# Patient Record
Sex: MALE | Race: WHITE | NOT HISPANIC OR LATINO | Employment: OTHER | URBAN - METROPOLITAN AREA
[De-identification: names, ages, dates, MRNs, and addresses within clinical notes are randomized per-mention and may not be internally consistent; named-entity substitution may affect disease eponyms.]

---

## 2020-09-14 ENCOUNTER — TRANSFERRED RECORDS (OUTPATIENT)
Dept: HEALTH INFORMATION MANAGEMENT | Facility: CLINIC | Age: 25
End: 2020-09-14

## 2020-12-30 ENCOUNTER — TRANSFERRED RECORDS (OUTPATIENT)
Dept: HEALTH INFORMATION MANAGEMENT | Facility: CLINIC | Age: 25
End: 2020-12-30

## 2023-07-18 ENCOUNTER — TRANSFERRED RECORDS (OUTPATIENT)
Dept: HEALTH INFORMATION MANAGEMENT | Facility: CLINIC | Age: 28
End: 2023-07-18

## 2023-10-02 ENCOUNTER — TRANSCRIBE ORDERS (OUTPATIENT)
Dept: OTHER | Age: 28
End: 2023-10-02

## 2023-10-02 DIAGNOSIS — Q87.81 ALPORTS SYNDROME: Primary | ICD-10-CM

## 2023-10-09 ENCOUNTER — OFFICE VISIT (OUTPATIENT)
Dept: CONSULT | Facility: CLINIC | Age: 28
End: 2023-10-09
Attending: STUDENT IN AN ORGANIZED HEALTH CARE EDUCATION/TRAINING PROGRAM
Payer: COMMERCIAL

## 2023-10-09 VITALS
HEIGHT: 70 IN | WEIGHT: 135.58 LBS | DIASTOLIC BLOOD PRESSURE: 71 MMHG | BODY MASS INDEX: 19.41 KG/M2 | HEART RATE: 102 BPM | RESPIRATION RATE: 18 BRPM | SYSTOLIC BLOOD PRESSURE: 108 MMHG | OXYGEN SATURATION: 97 %

## 2023-10-09 DIAGNOSIS — Z71.83 ENCOUNTER FOR NONPROCREATIVE GENETIC COUNSELING AND TESTING: Primary | ICD-10-CM

## 2023-10-09 DIAGNOSIS — Z71.3: ICD-10-CM

## 2023-10-09 DIAGNOSIS — K63.9 ENTEROPATHY: ICD-10-CM

## 2023-10-09 DIAGNOSIS — Z71.3 RESTRICTED DIET: Primary | ICD-10-CM

## 2023-10-09 DIAGNOSIS — Z13.71 ENCOUNTER FOR NONPROCREATIVE GENETIC COUNSELING AND TESTING: Primary | ICD-10-CM

## 2023-10-09 DIAGNOSIS — Z71.3 RESTRICTED DIET: ICD-10-CM

## 2023-10-09 DIAGNOSIS — Z84.1 MATERNAL FAMILY HISTORY OF RENAL DISEASE: ICD-10-CM

## 2023-10-09 DIAGNOSIS — Z84.1: ICD-10-CM

## 2023-10-09 LAB
CALCIUM SERPL-MCNC: 9.5 MG/DL (ref 8.6–10)
FERRITIN SERPL-MCNC: 67 NG/ML (ref 31–409)
IRON SERPL-MCNC: 168 UG/DL (ref 61–157)
PHOSPHATE SERPL-MCNC: 3.4 MG/DL (ref 2.5–4.5)
VIT B12 SERPL-MCNC: 1038 PG/ML (ref 232–1245)
VIT D+METAB SERPL-MCNC: 33 NG/ML (ref 20–50)

## 2023-10-09 PROCEDURE — 82310 ASSAY OF CALCIUM: CPT

## 2023-10-09 PROCEDURE — 84100 ASSAY OF PHOSPHORUS: CPT

## 2023-10-09 PROCEDURE — 84134 ASSAY OF PREALBUMIN: CPT

## 2023-10-09 PROCEDURE — 36415 COLL VENOUS BLD VENIPUNCTURE: CPT

## 2023-10-09 PROCEDURE — 82525 ASSAY OF COPPER: CPT

## 2023-10-09 PROCEDURE — 99204 OFFICE O/P NEW MOD 45 MIN: CPT | Performed by: STUDENT IN AN ORGANIZED HEALTH CARE EDUCATION/TRAINING PROGRAM

## 2023-10-09 PROCEDURE — 84255 ASSAY OF SELENIUM: CPT

## 2023-10-09 PROCEDURE — 82607 VITAMIN B-12: CPT

## 2023-10-09 PROCEDURE — 96040 HC GENETIC COUNSELING, EACH 30 MINUTES: CPT

## 2023-10-09 PROCEDURE — 999N000069 HC STATISTIC GENETIC COUNSELING, < 16 MIN

## 2023-10-09 PROCEDURE — 82306 VITAMIN D 25 HYDROXY: CPT

## 2023-10-09 PROCEDURE — 82728 ASSAY OF FERRITIN: CPT

## 2023-10-09 PROCEDURE — 84630 ASSAY OF ZINC: CPT

## 2023-10-09 PROCEDURE — 83540 ASSAY OF IRON: CPT

## 2023-10-09 PROCEDURE — 82725 ASSAY OF BLOOD FATTY ACIDS: CPT

## 2023-10-09 PROCEDURE — 82139 AMINO ACIDS QUAN 6 OR MORE: CPT

## 2023-10-09 NOTE — LETTER
"10/9/2023      RE: Patrick Elizabeth  [address]  [facility] [address]     Dear Colleague,    Thank you for the opportunity to participate in the care of your patient, Patrick Elizabeth, at the [facility] at [facility]. Please see a copy of my visit note below.    GENETICS CLINIC CONSULTATION     Name:  Patrick Elizabeth \"Patrick\"  :   1995  MRN:   [id number]  Date of service: Oct 9, 2023  Primary Provider: System, Provider Not In  Referring Provider: Provider Not In System    Presenting Information:  Patrick Elizabeth is a 28 year old male presenting to general genetics clinic due to a history of severe and extensive allergies, immunodeficiencies, and non diagnostic genetic testing. He was here today with his mother and father. I met with the family at the request of Dr. Mack to obtain a 3 generation family history, discuss genetic testing options and obtain informed consent.     HPI:  Patrick has a life long history of non-anaphylactic allergies to both foods and environmental allergens. He has only consumed Neocate infant elemental formula since infancy. His primary symptoms after exposures are gastrointestinal distress with diarrhea. He also notes that cutaneous exposures will prompt rashes and inhaled exposures will aggravate his sinuses.    Aside from copious interruptions to his schooling and typical childhood experiences due to severe medical needs, Patrick is reasonably neurotypical. He has had neuropsychological testing, which is included in his referral notes. Patrick shares that he has depression managed with neuro biofeedback therapy which is working well.     Patrick was hospitalized frequently as a child. His mother notes that when he was ill his body temperature would drop (sometimes to 94-95 F) and she would have to bundle him and use heating pads to keep him warm. His family endorses an almost " cyclical nature to his upper respiratory symptoms, despite Patrick rarely leaving the home and parents both masking 100% of the time outside the home.    Patrick describes his illness as having flare-ups which then return to his baseline. Sometimes it will take many months to return to baseline after a flare up. Patrick shared that, over time, his baseline level of health distress is worsening.    Upon recently requesting Patrick's pediatric records, his mother notes that he was found to have blood and protein in his urine as a child.     Please see Dr. Mack's note for details.    Social History  Patrick lives at home with his parents. He primarily does not leave the home.  Father available for testing: Yes  Mother available for testing: Yes  Full sibling available for testing: Yes (not in [place])  Half sibling available for testing: NA    Pregnancy/ History  Mother's age: 33 years  Father's age: 36 years  Patrick was born at 39 weeks gestation via vaginal delivery   Prenatal care was received.  Pregnancy complications included history of  labor, placental separation, and pre-eclampsia  Prenatal testing included ultrasounds  Prenatal exposure and acute maternal illness during pregnancy: none reported  The APGAR scores were unavailable  Birth weight: 7 lb 12 oz  Birth length: Data Unavailable  Birth head circumference: Data unavailable  Complications in the  period included: Jaundice. Mother reports jaundice resolving once they started Patrick on Neocate elemental formula.    Developmental History  Reasonably typical aside from impacts of chronic illness on development. Patrick's parents pursued many therapies to assist him in development despite these obstacles.    Previous Genetic Testing  Whole Exome Sequencing with Mitochondrial Analysis through [facility] in ; ordered by Dr. Pawel Barragan. Non-diagnostic. 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 TLR3 has no currently described disease association. [facility] Secondary Findings were ACCEPTED and NEGATIVE. Mitochondrial analysis was NEGATIVE.    Patrick is also involved in the research program [employer] Kids ([email or web address]-[email or web address]-answers-[email or web address]).    Patrick has also previously been seen by the [facility]osed Disease [facility] (UDN).    Family History:   A three generation pedigree was obtained today by patient report and scanned into the EMR. The following information is significant:    Children:  Patrick has no biological children by choice.  Siblings:  Patrick is the third child born to his parents together.   His eldest sister, Marni, is 34 years old.There is suspicion that she has fibromyalgia. She does not have children.  His other sister, Sophia, is 32 years old. She has Bechets and was recently found to have Alport Syndrome. She has a 7 month son.  Maternal Relatives:  Mother is Marleny. She is 59 years old. She has a history of food allergies with onset in adulthood requiring her to eat a very limited diet. She has fibromyalgia. She was found to have a pathogenic COL4A5 mutation (c.1871G>A; p.[id number]) which is consistent with X-linked Alport Syndrome. She noted that she does have hematuria.   Marleny has a 55 year old sister who has brain cancer and fibromyalgia. She has a daughter with a history of a legume allergy she outgrew and a healthy son.  Grandmother passed away at 86 years old from dementia. She had onset of food allergies later in life. Her father (Marleny's maternal grandfather) was born with \"deformed\" fingers and toes. He was otherwise in typical health and lived into old age.  Grandfather passed away in his late 80s from prostate cancer.  Paternal Relatives:  Father is Tobias. He is 62 years old. He has a history of thin basement membrane nephropathy (TBMN) and was found to have an uncertain variant in COL4A4, " "which is associated with both dominant and recessive Alport Syndrome. Per the genetic testing laboratory that completed his testing, there is strong suspicion that this uncertain variant is harmful and is likely the explanation for his TBMN.  Tobias has a sister with kidney stones and heart ablation.  Tobias has a brother with kidney stones.  Tobias has a niece with arthritis and other nieces/nephews who are well.  Grandmother has arthritis and autoimmune disease. Her sister's daughter (Saba) has high myopia.  Grandfather has \"heart problems\".      Ancestry deferred. Consanguinity denied.     The remainder of the family history was negative for birth defects, learning difficulties, intellectual disability, vision/hearing loss, seizures, muscle weakness, recurrent miscarriage, sudden death, infant/ death and known genetic conditions.     Please see scanned in pedigree under the media tab.     Discussion:    We discussed the option of Whole Exome Sequencing re-analysis given updates in Patrick's personal and family medical history since his original testing.    We reviewed that this would not require an additional sample and would be no charge. Basically, the lab runs the existing data through their interpretation software again to see if there are any genetic differences that they did not think were significant when the testing was originally done that they think are significant now. This process typically takes a couple of months.    We discussed how providing updated history for Patrick as well as the new family history of Alport Syndrome is very important because the lab's interpretation of KATYA data is dependent on the personal and family medical history that we provide to the laboratory.    Patrick elected to proceed with re-analysis at this time. He and his parents completed informed consent paperwork again since the original testing was through a different healthcare system.    We reviewed ACMG " Secondary Findings and discussed that the list of included genes as been updated since Patrick's original testing. Previously, the family opted IN for Secondary findings for the proband and relatives. The family reaffirmed their desire to OPT IN for secondary findings for Patrick and relatives for the reanalysis.    Additional questions denied at this time. The family is encouraged to reach out if there are questions in the interim.     Plan  1. No charge exome re-analysis through [facility] with updated personal and family medical information  2. Results anticipated a couple of months after testing begins. We will call when they are available.  3. Additional recommendations per Dr. Mack     Please do not hesitate to reach out with any questions or concerns. It was a pleasure to participate in Patrick's care today.       Eloisa Ag MS, [facility]  Genetic Counseling  [facility]  Pager: [phone]  Phone: [phone]  Fax: [phone]       Approximate Time Spent in Consultation: 40 min          Please do not hesitate to contact me if you have any questions/concerns.     Sincerely,       Eloisa Ag, GC

## 2023-10-09 NOTE — PATIENT INSTRUCTIONS
Plan  1. No charge exome re-analysis through [facility] with updated personal and family medical information  2. Results anticipated a couple of months after testing begins. We will call when they are available.  3. Additional recommendations per Dr. Mack     Please do not hesitate to reach out with any questions or concerns. It was a pleasure to participate in Patrick's care today.       Eloisa Ag MS, [facility]  Genetic Counseling  [facility]  Phone: [phone]  Fax: [phone]

## 2023-10-09 NOTE — NURSING NOTE
"Chief Complaint   Patient presents with    Consult     /71   Pulse 102   Resp 18   Ht 1.77 m (5' 9.69\")   Wt 61.5 kg (135 lb 9.3 oz)   SpO2 97%   BMI 19.63 kg/m    Cortes Samson  October 9, 2023    "

## 2023-10-09 NOTE — LETTER
10/9/2023      RE: Patrick Elizabeth  [address]  [facility] [address]     Dear Colleague,    Thank you for the opportunity to participate in the care of your patient, Patrick Elizabeth, at the [facility] at [facility]. Please see a copy of my visit note below.    Patient: Patrick Elizabeth  YOB: 1995  Medical Record: [id number]  Visit date: Oct 9, 2023    Dear colleague,     It was a great pleasure to see Patrick Elizabeth in genetics clinic.   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  Genetic workup so far has been nonrevealing.  We will reinterrogate existing testing as our next step    Please see additional details and more complete assessment and plan in the note that follows below.    Chief Complaint:   - Patrick Elizabeth is a 28 year old male presenting to general genetics clinic due to a history of severe and extensive allergies, immunodeficiencies, and non diagnostic genetic testing.     History of present illness:   - Patrick has a long and severe history of significant known anaphylactic allergies.  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  Particularly that this impacts his eating.  He has taken essentially only Neocate infant elemental formula his whole life.  If he has other foods this leads to gas parental show external distress and diarrhea.  Exposures to the skin will lead to rashes and inhaled exposures can lead to sinus symptoms.  He has significant eczema and acne.  He has chronic benign neutropenia and is on cholestyramine previous to that he had no solid stools for 7 years.  At times he has protein and blood in urine. He has lots of congestion.  On elbows and knees he has dry flaking skin.  When he gets sick his temperature actually goes down to 94 to 95  F.    Additionally during periods of  illness he has had significant and severe neurological reactions with depression, tiredness, mental fog, memory loss, and cognitive problems with skill regression.  At times he has had to relearn skills.  A flu case completely erased his memory for some basic math facts and activities of daily living.    Previous exome analysis at [facility] I was nonrevealing of a diagnosis for his condition.    Finally in addition: Alport syndrome was new recently noted in the family.  Father has a COL4A4 VUS and mother has a COL4A5 pathogenic variant.     from GC note today:   Patrick has a life long history of non-anaphylactic allergies to both foods and environmental allergens. He has only consumed Neocate infant elemental formula since infancy. His primary symptoms after exposures are gastrointestinal distress with diarrhea. He also notes that cutaneous exposures will prompt rashes and inhaled exposures will aggravate his sinuses.  Aside from copious interruptions to his schooling and typical childhood experiences due to severe medical needs, Patrick is reasonably neurotypical. He has had neuropsychological testing, which is included in his referral notes. Patrick shares that he has depression managed with neuro biofeedback therapy which is working well.     Patrick was hospitalized frequently as a child. His mother notes that when he was ill his body temperature would drop (sometimes to 94-95 F) and she would have to bundle him and use heating pads to keep him warm. His family endorses an almost cyclical nature to his upper respiratory symptoms, despite Patrick rarely leaving the home and parents both masking 100% of the time outside the home.    Patrick describes his illness as having flare-ups which then return to his baseline. Sometimes it will take many months to return to baseline after a flare up. Patrick shared that, over time, his baseline level of health distress is worsening.    Upon recently requesting Patrick's pediatric  "records, his mother notes that he was found to have blood and protein in his urine as a child.       Other History     Past medical history:  -  There is no problem list on file for this patient.    No past medical history on file.    Pregnancy and birth history:  - Patrick was born at 39 weeks gestation via vaginal delivery. His mother was 33 years of age and his father was 36 years of age. Prenatal care was received. Pregnancy complications included history of  labor, placental separation, and pre-eclampsia. Prenatal testing included ultrasounds. No prenatal exposures or acute maternal illness in pregnancy reported.  complications include jaundice which resolved once he was started on Neocate elemental formula.   Birth weight: 7 lb 12 oz     Developmental history:  - Development was typical aside of impacts of chronic illness. There was several occasions he needed to relearn skills or schoolwork after experiencing bouts of illness. He has difficulty with handwriting.  He has had OT in the past.  He does have some difficulty with handwriting.    Medications:  -  No current outpatient medications on file.       Allergies:  -   No Known Allergies    Family history:  - See genetic counseling note.    Social history:  - The formula company makes original Neocate just for him.  Patrick lives at home with his parents and generally does not leave his home.  Both parents are available for genetic testing.  Mother is a speech-language pathologist, sister does immune research.  Patrick has a bachelors in accounting    Physical Exam:     Physical exam:  There were no vitals taken for this visit.    Wt Readings from Last 2 Encounters:   10/09/23 135 lb 9.3 oz (61.5 kg)       Ht Readings from Last 2 Encounters:   10/09/23 5' 9.69\" (177 cm)       BMI: No height and weight on file for this encounter.    General: Young adult male in no acute distress  Facies/head: Normocephalic, nondysmorphic  Neuro: Awake, alert, " facial symmetric.  Normal gait, normal language.  Eyes: Normal lids, lashes, sclera, conjunctiva  Ears: Normal morphology and placement of ears bilaterally  Mouth/Oropharynx: Normal lips, normal visible teeth and tongue, moist oral mucosa  Neck: Supple, no masses nor pits noted  Chest: Symmetric  Cardiovascular: Normal S1 and S2 heart sounds without abnormal sounds heard. normal pulses  Respiratory: Nonlabored breathing on room air, normal air entry on auscultation bilaterally  Abdominal: Soft, nontender, nondistended no organomegaly  Extremities: Normal creases and digitation  Skin: Rash on the abdomen, very dry skin on the feet.  Genitourinary: Deferred      Data:     Previous genetic studies:  - Whole Exome Sequencing with Mitochondrial Analysis through [facility] in 2020; ordered by Dr. Pawel Barragan. Non-diagnostic. 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 TLR3 has no currently described disease association. [facility] Secondary Findings were ACCEPTED and NEGATIVE. Mitochondrial analysis was NEGATIVE.     Patrick is also involved in the research program Gen[employer]swers for Kids ([email or web address]-[email or web address]).     Patrick has also previously been seen by the Undiagnosed Disease Network (UDN).    Assessment and recommendations::     Assessment:  - Patrick is a 22-year-old male with pronounced and extreme immune responses to noninfectious triggers.  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  Workup is so far been negative in multiple locations    For the visit today we considered or addressed the following issues:   Restricted diet  Encounter for monitoring of protein modification diet  Enteropathy    Recommendations:  -No charge exome re-analysis through [facility] with updated personal and family medical information. Results anticipated  a couple of months after testing begins. We will call when they are available.  -Obtained nutritional labs. Pending results with discuss changes to his formula.     Orders Placed This Encounter   Procedures     Selenium     Prealbumin     Fatty Acid Essential Test     Copper level     Zinc     Phosphorus     Calcium     Ferritin     Iron     Vitamin D Deficiency     Vitamin B12     Amino acids plasma quantitative     Carnitine Plasma        ---------------------------------------------------  Closing:  It was a great pleasure to have Patrick Elizabeth in clinic       Carola Xiong, MS4  [employer]   10/17/2024      ------       I was present with the trainee who participated in the documentation of this note. I personally saw and evaluated the patient and performed my own history and examination. I discussed the case with the trainee. I have reviewed, verified, and revised the note as necessary and agree with the content and plan as written by the trainee and edited/added to by me.       I completed this note started by the trainee. Exam was done along with the trainee. I was present for the whole encounter including elicitation of key history. Note above indicates my medical decision making.       I very much appreciate the help of Carola Xiong in preparation of this documentation and in working with this patient.     51 min spent on the date of the encounter in chart review, patient visit, review of tests, documentation and/or discussion with other providers about the issues documented above.    Rex Mack, [facility], FAAP, FACMG  Division of Genetics and Metabolism,   Department of Pediatrics  [email or web address]                      Please do not hesitate to contact me if you have any questions/concerns.     Sincerely,       Rex Mack Jr, MD

## 2023-10-09 NOTE — PROGRESS NOTES
"GENETICS CLINIC CONSULTATION     Name:  Patrick Elizabeth \"Patrick\"  :   1995  MRN:   [id number]  Date of service: Oct 9, 2023  Primary Provider: System, Provider Not In  Referring Provider: Provider Not In System    Presenting Information:  Patrick Elizabeth is a 28 year old male presenting to general genetics clinic due to a history of severe and extensive allergies, immunodeficiencies, and non diagnostic genetic testing. He was here today with his mother and father. I met with the family at the request of Dr. Mack to obtain a 3 generation family history, discuss genetic testing options and obtain informed consent.     HPI:  Patrick has a life long history of non-anaphylactic allergies to both foods and environmental allergens. He has only consumed Neocate infant elemental formula since infancy. His primary symptoms after exposures are gastrointestinal distress with diarrhea. He also notes that cutaneous exposures will prompt rashes and inhaled exposures will aggravate his sinuses.    Aside from copious interruptions to his schooling and typical childhood experiences due to severe medical needs, Patrick is reasonably neurotypical. He has had neuropsychological testing, which is included in his referral notes. Patrick shares that he has depression managed with neuro biofeedback therapy which is working well.     Patrick was hospitalized frequently as a child. His mother notes that when he was ill his body temperature would drop (sometimes to 94-95 F) and she would have to bundle him and use heating pads to keep him warm. His family endorses an almost cyclical nature to his upper respiratory symptoms, despite Patrick rarely leaving the home and parents both masking 100% of the time outside the home.    Patrick describes his illness as having flare-ups which then return to his baseline. Sometimes it will take many months to return to baseline after a flare up. Patrick shared that, over time, his baseline level of " health distress is worsening.    Upon recently requesting Patrick's pediatric records, his mother notes that he was found to have blood and protein in his urine as a child.     Please see Dr. Mack's note for details.    Social History  Patrick lives at home with his parents. He primarily does not leave the home.  Father available for testing: Yes  Mother available for testing: Yes  Full sibling available for testing: Yes (not in [place])  Half sibling available for testing: NA    Pregnancy/ History  Mother's age: 33 years  Father's age: 36 years  Patrick was born at 39 weeks gestation via vaginal delivery   Prenatal care was received.  Pregnancy complications included history of  labor, placental separation, and pre-eclampsia  Prenatal testing included ultrasounds  Prenatal exposure and acute maternal illness during pregnancy: none reported  The APGAR scores were unavailable  Birth weight: 7 lb 12 oz  Birth length: Data Unavailable  Birth head circumference: Data unavailable  Complications in the  period included: Jaundice. Mother reports jaundice resolving once they started Patrick on Neocate elemental formula.    Developmental History  Reasonably typical aside from impacts of chronic illness on development. Patrick's parents pursued many therapies to assist him in development despite these obstacles.    Previous Genetic Testing  Whole Exome Sequencing with Mitochondrial Analysis through [facility] in ; ordered by Dr. Pawel Barragan. Non-diagnostic. 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 TLR3 has no currently described disease association. [facility] Secondary Findings were ACCEPTED and NEGATIVE. Mitochondrial analysis was NEGATIVE.    Patrick is also involved in the research program [employer] [facility] ([email or web address]-[email or web address]-and-[email or web address]-answers-for-kids/).    Patrick has also  "previously been seen by the [facility]osed [facility] (UDN).    Family History:   A three generation pedigree was obtained today by patient report and scanned into the EMR. The following information is significant:    Children:  Patrick has no biological children by choice.  Siblings:  Patrick is the third child born to his parents together.   His eldest sister, Marni, is 34 years old.There is suspicion that she has fibromyalgia. She does not have children.  His other sister, Sophia, is 32 years old. She has Bechets and was recently found to have Alport Syndrome. She has a 7 month son.  Maternal Relatives:  Mother is Marleny. She is 59 years old. She has a history of food allergies with onset in adulthood requiring her to eat a very limited diet. She has fibromyalgia. She was found to have a pathogenic COL4A5 mutation (c.1871G>A; p.Ucf152Qge) which is consistent with X-linked Alport Syndrome. She noted that she does have hematuria.   Marleny has a 55 year old sister who has brain cancer and fibromyalgia. She has a daughter with a history of a legume allergy she outgrew and a healthy son.  Grandmother passed away at 86 years old from dementia. She had onset of food allergies later in life. Her father (Marleny's maternal grandfather) was born with \"deformed\" fingers and toes. He was otherwise in typical health and lived into old age.  Grandfather passed away in his late 80s from prostate cancer.  Paternal Relatives:  Father is Tobias. He is 62 years old. He has a history of thin basement membrane nephropathy (TBMN) and was found to have an uncertain variant in COL4A4, which is associated with both dominant and recessive Alport Syndrome. Per the genetic testing laboratory that completed his testing, there is strong suspicion that this uncertain variant is harmful and is likely the explanation for his TBMN.  Tobias has a sister with kidney stones and heart ablation.  Tobias has a brother with kidney stones.  Tobias has a " "niece with arthritis and other nieces/nephews who are well.  Grandmother has arthritis and autoimmune disease. Her sister's daughter (Saba) has high myopia.  Grandfather has \"heart problems\".      Ancestry deferred. Consanguinity denied.     The remainder of the family history was negative for birth defects, learning difficulties, intellectual disability, vision/hearing loss, seizures, muscle weakness, recurrent miscarriage, sudden death, infant/ death and known genetic conditions.     Please see scanned in pedigree under the media tab.     Discussion:    We discussed the option of Whole Exome Sequencing re-analysis given updates in Patrick's personal and family medical history since his original testing.    We reviewed that this would not require an additional sample and would be no charge. Basically, the lab runs the existing data through their interpretation software again to see if there are any genetic differences that they did not think were significant when the testing was originally done that they think are significant now. This process typically takes a couple of months.    We discussed how providing updated history for Patrick as well as the new family history of Alport Syndrome is very important because the lab's interpretation of KATYA data is dependent on the personal and family medical history that we provide to the laboratory.    Patrick elected to proceed with re-analysis at this time. He and his parents completed informed consent paperwork again since the original testing was through a different healthcare system.    We reviewed ACMG Secondary Findings and discussed that the list of included genes as been updated since Patrick's original testing. Previously, the family opted IN for Secondary findings for the proband and relatives. The family reaffirmed their desire to OPT IN for secondary findings for Patrick and relatives for the reanalysis.    Additional questions denied at this time. The family " is encouraged to reach out if there are questions in the interim.     Plan  1. No charge exome re-analysis through [facility] with updated personal and family medical information  2. Results anticipated a couple of months after testing begins. We will call when they are available.  3. Additional recommendations per Dr. Mack     Please do not hesitate to reach out with any questions or concerns. It was a pleasure to participate in Patrick's care today.       Eloisa Ag MS, [facility]  Genetic Counseling  [facility]  Pager: [phone]  Phone: [phone]  Fax: [phone]       Approximate Time Spent in Consultation: 40 min

## 2023-10-10 LAB — PREALB SERPL IA-MCNC: 26 MG/DL (ref 15–45)

## 2023-10-11 LAB
(HCYS)2 SERPL-SCNC: 0 UMOL/DL
1ME-HIST SERPL-SCNC: 0 UMOL/DL (ref 0–2)
3ME-HISTIDINE SERPL-SCNC: <1 UMOL/DL (ref 0–1)
AAA SERPL-SCNC: 0 UMOL/DL (ref 0–6)
ALANINE SERPL-SCNC: 0 UMOL/DL
ALANINE SFR SERPL: 32 UMOL/DL (ref 22–62)
AMINO ACID PAT SERPL-IMP: ABNORMAL
ANSERINE SERPL-SCNC: 0 UMOL/DL
ARGININE SERPL-SCNC: 6 UMOL/DL (ref 2–18)
ASPARAGINE SERPL-SCNC: 3 UMOL/DL (ref 1–5)
ASPARTATE SERPL-SCNC: <1 UMOL/DL (ref 0–4)
B-AIB SERPL-SCNC: 0 UMOL/DL
CARNOSINE SERPL-SCNC: 0 UMOL/DL
CITRULLINE SERPL-SCNC: 3.3 UMOL/DL (ref 1.3–6)
COPPER SERPL-MCNC: 52.8 UG/DL
CYSTATHIONIN SERPL-SCNC: 0 UMOL/DL
CYSTINE SERPL-SCNC: 8 UMOL/DL (ref 3–15)
GLUTAMATE SERPL-SCNC: 2 UMOL/DL (ref 0–16)
GLUTAMATE SERPL-SCNC: 50 UMOL/DL (ref 41–86)
GLYCINE SERPL-SCNC: 36 UMOL/DL (ref 13–50)
HISTIDINE SERPL-SCNC: 9 UMOL/DL (ref 3–15)
ISOLEUCINE SERPL-SCNC: 4 UMOL/DL (ref 4–11)
LEUCINE SERPL-SCNC: 9 UMOL/DL (ref 8–21)
LYSINE SERPL-SCNC: 22 UMOL/DL (ref 6–26)
METHIONINE SERPL-SCNC: 2 UMOL/DL (ref 1–6)
OH-LYSINE SERPL-SCNC: 0 UMOL/DL
OH-PROLINE SERPL-SCNC: 1 UMOL/DL (ref 0–3)
ORNITHINE SERPL-SCNC: 5 UMOL/DL (ref 2–16)
PHE SERPL-SCNC: 4.1 UMOL/DL (ref 3–10)
PROLINE SERPL-SCNC: 22 UMOL/DL (ref 0–48)
SARCOSINE SERPL-SCNC: 0 UMOL/DL
SELENIUM SERPL-MCNC: 103.2 UG/L
SERINE SERPL-SCNC: 14 UMOL/DL (ref 4–18)
TAURINE SERPL-SCNC: 5 UMOL/DL (ref 7–32)
THREONINE SERPL-SCNC: 17 UMOL/DL (ref 5–25)
TYROSINE SERPL-SCNC: 3.1 UMOL/DL (ref 4–13)
VALINE SERPL-SCNC: 17 UMOL/DL (ref 8–46)
ZINC SERPL-MCNC: 73 UG/DL

## 2023-10-12 LAB
A-LINOLENATE SERPL-SCNC: 44 NMOL/ML (ref 50–130)
AA SERPL-SCNC: 580 NMOL/ML (ref 520–1490)
ARACHIDATE SERPL-SCNC: 21 NMOL/ML (ref 50–90)
CLINICAL BIOCHEMIST REVIEW: ABNORMAL
DHA SERPL-SCNC: 54 NMOL/ML (ref 30–250)
DOCOSAPENTAENATE W6 SERPL-SCNC: 8 NMOL/ML (ref 10–70)
DOCOSATETRAENOATE SERPL-SCNC: 18 NMOL/ML (ref 10–80)
DOCOSENOATE SERPL-SCNC: 2 NMOL/ML (ref 4–13)
DPA SERPL-SCNC: 20 NMOL/ML (ref 20–210)
EPA SERPL-SCNC: 17 NMOL/ML (ref 14–100)
FA SERPL-SCNC: 8.8 MMOL/L (ref 7.3–16.8)
G-LINOLENATE SERPL-SCNC: 28 NMOL/ML (ref 16–150)
HEXADECENOATE SERPL-SCNC: 43 NMOL/ML (ref 25–105)
HOMO-G LINOLENATE SERPL-SCNC: 86 NMOL/ML (ref 50–250)
LAURATE SERPL-SCNC: 93 NMOL/ML (ref 6–90)
LINOLEATE SERPL-SCNC: 2858 NMOL/ML (ref 2270–3850)
MEAD ACID SERPL-SCNC: 9 NMOL/ML (ref 7–30)
MONOUNSAT FA SERPL-SCNC: 2.5 MMOL/L (ref 1.3–5.8)
MYRISTATE SERPL-SCNC: 150 NMOL/ML (ref 30–450)
NERVONATE SERPL-SCNC: 73 NMOL/ML (ref 60–100)
OCTADECANOATE SERPL-SCNC: 479 NMOL/ML (ref 590–1170)
OLEATE SERPL-SCNC: 2200 NMOL/ML (ref 650–3500)
PALMITATE SERPL-SCNC: 1698 NMOL/ML (ref 1480–3730)
PALMITOLEATE SERPL-SCNC: 91 NMOL/ML (ref 110–1130)
POLYUNSAT FA SERPL-SCNC: 3.7 MMOL/L (ref 3.2–5.8)
SAT FA SERPL-SCNC: 2.5 MMOL/L (ref 2.5–5.5)
TRIENOATE/AA SERPL-SRTO: 0.02 {RATIO} (ref 0.01–0.04)
VACCENATE SERPL-SCNC: 103 NMOL/ML (ref 280–740)
W3 FA SERPL-SCNC: 0.1 MMOL/L (ref 0.2–0.5)
W6 FA SERPL-SCNC: 3.6 MMOL/L (ref 3–5.4)

## 2023-10-17 NOTE — PROGRESS NOTES
Patient: Patrick Elizabeth  YOB: 1995  Medical Record: [id number]  Visit date: Oct 9, 2023    Dear colleague,     It was a great pleasure to see Patrick Elizabeth in genetics clinic.   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  Genetic workup so far has been nonrevealing.  We will reinterrogate existing testing as our next step    Please see additional details and more complete assessment and plan in the note that follows below.    Chief Complaint:   - Patrick Elizabeth is a 28 year old male presenting to general genetics clinic due to a history of severe and extensive allergies, immunodeficiencies, and non diagnostic genetic testing.     History of present illness:   - Patrick has a long and severe history of significant known anaphylactic allergies.  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  Particularly that this impacts his eating.  He has taken essentially only Neocate infant elemental formula his whole life.  If he has other foods this leads to gas parental show external distress and diarrhea.  Exposures to the skin will lead to rashes and inhaled exposures can lead to sinus symptoms.  He has significant eczema and acne.  He has chronic benign neutropenia and is on cholestyramine previous to that he had no solid stools for 7 years.  At times he has protein and blood in urine. He has lots of congestion.  On elbows and knees he has dry flaking skin.  When he gets sick his temperature actually goes down to 94 to 95  F.    Additionally during periods of illness he has had significant and severe neurological reactions with depression, tiredness, mental fog, memory loss, and cognitive problems with skill regression.  At times he has had to relearn skills.  A flu case completely erased his memory for some basic math facts and activities of daily living.    Previous exome analysis at [facility] was nonrevealing of a  diagnosis for his condition.    Finally in addition: Alport syndrome was new recently noted in the family.  Father has a COL4A4 VUS and mother has a COL4A5 pathogenic variant.     from GC note today:   Patrick has a life long history of non-anaphylactic allergies to both foods and environmental allergens. He has only consumed Neocate infant elemental formula since infancy. His primary symptoms after exposures are gastrointestinal distress with diarrhea. He also notes that cutaneous exposures will prompt rashes and inhaled exposures will aggravate his sinuses.  Aside from copious interruptions to his schooling and typical childhood experiences due to severe medical needs, Patrick is reasonably neurotypical. He has had neuropsychological testing, which is included in his referral notes. Patrick shares that he has depression managed with neuro biofeedback therapy which is working well.     Patrick was hospitalized frequently as a child. His mother notes that when he was ill his body temperature would drop (sometimes to 94-95 F) and she would have to bundle him and use heating pads to keep him warm. His family endorses an almost cyclical nature to his upper respiratory symptoms, despite Patrick rarely leaving the home and parents both masking 100% of the time outside the home.    Patrick describes his illness as having flare-ups which then return to his baseline. Sometimes it will take many months to return to baseline after a flare up. Patrick shared that, over time, his baseline level of health distress is worsening.    Upon recently requesting Patrick's pediatric records, his mother notes that he was found to have blood and protein in his urine as a child.       Other History     Past medical history:  -  There is no problem list on file for this patient.    No past medical history on file.    Pregnancy and birth history:  - Patrick was born at 39 weeks gestation via vaginal delivery. His mother was 33 years of age and his  "father was 36 years of age. Prenatal care was received. Pregnancy complications included history of  labor, placental separation, and pre-eclampsia. Prenatal testing included ultrasounds. No prenatal exposures or acute maternal illness in pregnancy reported.  complications include jaundice which resolved once he was started on Neocate elemental formula.   Birth weight: 7 lb 12 oz     Developmental history:  - Development was typical aside of impacts of chronic illness. There was several occasions he needed to relearn skills or schoolwork after experiencing bouts of illness. He has difficulty with handwriting.  He has had OT in the past.  He does have some difficulty with handwriting.    Medications:  -  No current outpatient medications on file.       Allergies:  -   No Known Allergies    Family history:  - See genetic counseling note.    Social history:  - The formula company makes original Neocate just for him.  Patrick lives at home with his parents and generally does not leave his home.  Both parents are available for genetic testing.  Mother is a speech-language pathologist, sister does immune research.  Patrick has a bachelors in accounting    Physical Exam:     Physical exam:  There were no vitals taken for this visit.    Wt Readings from Last 2 Encounters:   10/09/23 135 lb 9.3 oz (61.5 kg)       Ht Readings from Last 2 Encounters:   10/09/23 5' 9.69\" (177 cm)       BMI: No height and weight on file for this encounter.    General: Young adult male in no acute distress  Facies/head: Normocephalic, nondysmorphic  Neuro: Awake, alert, facial symmetric.  Normal gait, normal language.  Eyes: Normal lids, lashes, sclera, conjunctiva  Ears: Normal morphology and placement of ears bilaterally  Mouth/Oropharynx: Normal lips, normal visible teeth and tongue, moist oral mucosa  Neck: Supple, no masses nor pits noted  Chest: Symmetric  Cardiovascular: Normal S1 and S2 heart sounds without abnormal sounds " heard. normal pulses  Respiratory: Nonlabored breathing on room air, normal air entry on auscultation bilaterally  Abdominal: Soft, nontender, nondistended no organomegaly  Extremities: Normal creases and digitation  Skin: Rash on the abdomen, very dry skin on the feet.  Genitourinary: Deferred      Data:     Previous genetic studies:  - Whole Exome Sequencing with Mitochondrial Analysis through [facility] in 2020; ordered by Dr. Pawel Barragan. Non-diagnostic. 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 TLR3 has no currently described disease association. [facility]CMG Secondary Findings were ACCEPTED and NEGATIVE. Mitochondrial analysis was NEGATIVE.     Patrick is also involved in the research program [employer] ([email or web address]-research-institute/studies-and-[email or web address]-[employer]-for-kids/).     Patrick has also previously been seen by the Undiagnosed Disease Network (UDN).    Assessment and recommendations::     Assessment:  - Patrick is a 22-year-old male with pronounced and extreme immune responses to noninfectious triggers.  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  Workup is so far been negative in multiple locations    For the visit today we considered or addressed the following issues:   Restricted diet  Encounter for monitoring of protein modification diet  Enteropathy    Recommendations:  -No charge exome re-analysis through [facility] with updated personal and family medical information. Results anticipated a couple of months after testing begins. We will call when they are available.  -Obtained nutritional labs. Pending results with discuss changes to his formula.     Orders Placed This Encounter   Procedures    Selenium    Prealbumin    Fatty Acid Essential Test    Copper level    Zinc    Phosphorus    Calcium    Ferritin    Iron    Vitamin D Deficiency    Vitamin  B12    Amino acids plasma quantitative    Carnitine Plasma        ---------------------------------------------------  Closing:  It was a great pleasure to have Patrick Elizabeth in clinic       Carola Xiong, MS4  [facility] [employer]   10/17/2024      ------       I was present with the trainee who participated in the documentation of this note. I personally saw and evaluated the patient and performed my own history and examination. I discussed the case with the trainee. I have reviewed, verified, and revised the note as necessary and agree with the content and plan as written by the trainee and edited/added to by me.       I completed this note started by the trainee. Exam was done along with the trainee. I was present for the whole encounter including elicitation of key history. Note above indicates my medical decision making.       I very much appreciate the help of Carola Xiong in preparation of this documentation and in working with this patient.     51 min spent on the date of the encounter in chart review, patient visit, review of tests, documentation and/or discussion with other providers about the issues documented above.    Rex Mack, LAZAROhD, FAAP, FACMG  Division of Genetics and Metabolism,   Department of Pediatrics  [email or web address]

## 2023-10-22 ENCOUNTER — HEALTH MAINTENANCE LETTER (OUTPATIENT)
Age: 28
End: 2023-10-22

## 2023-10-26 ENCOUNTER — TELEPHONE (OUTPATIENT)
Dept: CONSULT | Facility: CLINIC | Age: 28
End: 2023-10-26
Payer: COMMERCIAL

## 2023-10-26 DIAGNOSIS — R31.9 HEMATURIA: ICD-10-CM

## 2023-10-26 DIAGNOSIS — Z84.89 FAMILY HISTORY OF GENETIC DISEASE: ICD-10-CM

## 2023-10-26 DIAGNOSIS — R80.9 PROTEINURIA: Primary | ICD-10-CM

## 2023-10-26 NOTE — TELEPHONE ENCOUNTER
10/26/2023    I spoke with Patrick's mother, Marleny, on the phone regarding the concern that Patrick has Alport Syndrome. She shared that his recent labs have come back increasingly abnormal and that they have been put on a 6 month wait list to establish with a nephrologist locally.    I offered an 8AM appointment on Nov 3rd with Dr Ezequiel De Leon in our Kidney Genetics clinic which the family accepted. They are eager to discuss the condition, treatments and prognosis with a specialist, as their local doctors have not been familiar with Alport Syndrome.    I advised that I would brief Dr. De Leon on Patrick's complex health and genetic history prior to the appointment.     Marleny also asked whether the upcoming test reanalysis would in include the gene for Gilbert syndrome (due to bilirubin concerns for Patrick) and I advised that it would assess that and I would ensure the lab was aware of Patrick's bilirubin labs as well.    Marleny was appreciative for the call and our ability to get Patrick seen in nephrology quickly. I advised that they would receive appt details by [employer] and encouraged them to reach out with any questions in the interim.    Eloisa Ag MS, [facility]  Genetic Counseling  [facility]  Pager: [phone]  Phone: [phone]  Fax: [phone]

## 2023-11-03 ENCOUNTER — OFFICE VISIT (OUTPATIENT)
Dept: NEPHROLOGY | Facility: CLINIC | Age: 28
End: 2023-11-03
Payer: COMMERCIAL

## 2023-11-03 VITALS
HEART RATE: 100 BPM | WEIGHT: 137.13 LBS | HEIGHT: 70 IN | DIASTOLIC BLOOD PRESSURE: 60 MMHG | SYSTOLIC BLOOD PRESSURE: 104 MMHG | BODY MASS INDEX: 19.63 KG/M2

## 2023-11-03 DIAGNOSIS — R93.429 ABNORMAL FINDING ON DIAGNOSTIC IMAGING OF KIDNEY: ICD-10-CM

## 2023-11-03 DIAGNOSIS — Z84.2 FAMILY HISTORY OF HEMATURIA: ICD-10-CM

## 2023-11-03 DIAGNOSIS — K63.9 ENTEROPATHY: ICD-10-CM

## 2023-11-03 DIAGNOSIS — Z87.448 HISTORY OF HEMATURIA: ICD-10-CM

## 2023-11-03 DIAGNOSIS — J32.9 CHRONIC RHINOSINUSITIS: ICD-10-CM

## 2023-11-03 DIAGNOSIS — D84.9 IMMUNOLOGIC DEFICIENCY SYNDROME (H): ICD-10-CM

## 2023-11-03 DIAGNOSIS — Z84.89 FAMILY HISTORY OF GENETIC DISEASE: Primary | ICD-10-CM

## 2023-11-03 DIAGNOSIS — R79.89 ABNORMAL BILIRUBIN TEST: ICD-10-CM

## 2023-11-03 DIAGNOSIS — R80.9 MICROALBUMINURIA: ICD-10-CM

## 2023-11-03 PROCEDURE — 99213 OFFICE O/P EST LOW 20 MIN: CPT

## 2023-11-03 PROCEDURE — 99417 PROLNG OP E/M EACH 15 MIN: CPT

## 2023-11-03 PROCEDURE — 99205 OFFICE O/P NEW HI 60 MIN: CPT

## 2023-11-03 RX ORDER — CHOLESTYRAMINE
6 POWDER (GRAM) MISCELLANEOUS
COMMUNITY
Start: 2023-09-29

## 2023-11-03 RX ORDER — NUT.TX FOR PKU WITH IRON NO.2 0.06G-0.64
LIQUID (ML) ORAL
COMMUNITY
Start: 2023-07-25

## 2023-11-03 RX ORDER — ADAPALENE AND BENZOYL PEROXIDE 3; 25 MG/G; MG/G
GEL TOPICAL DAILY
COMMUNITY
Start: 2022-03-22

## 2023-11-03 ASSESSMENT — PAIN SCALES - GENERAL: PAINLEVEL: NO PAIN (0)

## 2023-11-03 NOTE — LETTER
"11/3/2023      RE: Patrick Elizabeth  [address]  [facility] [address]     Dear Colleague,    Thank you for the opportunity to participate in the care of your patient, Patrick Elizabeth, at the [facility] at [facility]. Please see a copy of my visit note below.    Outpatient Consultation    Consultation requested by Sandro Anderson.      Chief Complaint:    The patient is a 28-year-old man who presents with his mother and father for evaluation and management of family history of Alport syndrome.  History is from the patient, his parents, and EHR including Care Everywhere and scanned media.    HPI:      The patient lives in [place], [place], and has received care in numerous health care systems.  His sister was recently diagnosed with digenic Alport syndrome, arising by maternal COL4A5 p.[id number] mutation and paternal COL4A4 mutation for which we lack specifics.  His parents have known of intermittent hematuria and proteinuria since his childhood.  Remote urinalyses were normal in July 2015 and notable for 2+ dipstick protein with rare casts in April 2016.  Other available labs include creatinine of 1.3 in December 2017 and 1.24 in April 2022, and recent spot urine indicating unquantified microalbuminuria.  Renal ultrasound report dated April 2016 indicates 10.5-cm left and 7.9-cm right kidneys.    The patient has contended with illness since infancy, characterized by \"flares\" formerly brought on by food and/or environmental exposures, but now more independent thereof with recovery to a progressively less functional baseline.  With flares, he has exacerbation of chronic loose stools and variably abdominal cramps, skin outbreaks, and changes in cognition and mood.  He is chronically congested.  He denies significant urinary symptoms or hematuria.  He is nocturnal, contending with gastrointestinal systems overnight then sleeping " through the morning.  He leaves the house infrequently.    PMHx:    1.  Chronic kidney disease, as above.  2.  Inborn error of immunity, suspected, longitudinal care with Dr. 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  3.  Enteropathy, since infancy, on lifelong elemental formula after early succession of unsuccessful dietary trials, upper endoscopy 2016 normal with normal pathology, colonoscopy identifying friable tissue with pathologic findings of hyperplastic lymphoid aggregates.  4.  Childhood hospitalizations, roughly annual through elementary school, for respiratory and/or gastrointestinal illnesses, at least one precipitated by influenza B and another by mycoplasma infection.  5.  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  6.  Chronic rhinosinusitis, as above.  7.  Hyperbilirubinemia.  8.  Maternal pregnancy complications of  labor, placental abruption and preeclampsia, delivered near term.    FHx:    Mother with hematuria, albuminuria, decreased GFR and COL4A5 mutation as above.  Father who follows with Dr. Gardner in [place], [place] for hematuria with COL4A4 finding as above.  Sister with Alport syndrome as above, presenting with pregnancy complications.  Another older sister with COL4 mutation(s) by cascade testing.  At least one sister  with corneal ulcers.  Maternal great aunt who underwent kidney transplantation.  Several maternal relatives with hematuria.  Additional family history as documented in scanned pedigree of October 9, 2023.    SHx:    As above.  Schooling interruptions, nevertheless completing college.  No tobacco or alcohol.    Active Medications:  Current Outpatient Medications   Medication Sig Dispense Refill    adapalene-benzoyl peroxide (EPIDUO FORTE) 0.3-2.5 % gel Apply topically daily      Cholestyramine POWD Take 6 g by mouth      Nutritional Supplements (NEOCATE INFANT) POWD TAKE AS DIRECTED UP  OUNCES (1.3 SCOOPS PER OUNCE, UP TO SIX 24 OUNCE BOTTLES PER DAY) EACH LEVEL SCOOP CONTAINS 1.75 GMS, ALLOW 26,676 GMS PER MONTH          Medications reviewed and reconciled.    Review of Systems:    10 point review of systems notable for items above.  Normal hearing test in elementary school.    Physical Exam:      The patient is a young man who is taller than average, thin, appears in no acute distress, masked, bespectacled, alert and conversant.    Labs and Imaging:    None.    Impression:    1.  Family history of Alport syndrome.  2.  Microalbuminuria.  3.  Hematuria, reported.  4.  Kidney size asymmetry, historic, not clearly corroborated.  5.  Inborn error of immunity, suspected.    Plan:      1.  Ascertain paternal mutation.   2.  Will review options for expediting COL4 mutation testing with [facility], likely reanalysis of previous whole exome sequencing.  3.  Planning for further nephrology care pending above.  4.  Today's notes copied to referring and other providers, with gratitude for the opportunity to participate in the patient s care, and offer of availability for further questions or concerns.  5.  Discharge from [facility].  Return as needed.     Patient Education:    The patient's course was reviewed with him and his parents in detail.  All pertinent labs reviewed.  We sketched out  the anatomy of the kidneys and described their functions, indicating that labs to date were consistent with perhaps mild decrease in kidney cleaning capacity.  We agreed to revisit possible next steps once results of DNA testing were available.  We broached the possibility that considerations surrounding standard treatments for kidney disease would have to account for the patient's background medical conditions.     Time: 75 minutes.    Sincerely,    Ezequiel De Leon MD, PhD  Nephrology    CC:   HERMAN ARIAS    Copy to patient  Marleny Elizabeth   [address] [facility] [address]

## 2023-11-03 NOTE — PATIENT INSTRUCTIONS
--------------------------------------------------------------------------------------------------  Please contact our office with any questions or concerns.     Providers book out months in advance please schedule follow up appointments as soon as possible.     Scheduling and Questions: [phone]     services: [phone]    On-call Nephrologist for after hours, weekends and urgent concerns: [phone].    Nephrology Office Fax #: [phone]    Nephrology Nurses  Nurse Triage Line: [phone]

## 2023-11-03 NOTE — PROGRESS NOTES
"Outpatient Consultation    Consultation requested by Sandro Anderson.      Chief Complaint:    The patient is a 28-year-old man who presents with his mother and father for evaluation and management of family history of Alport syndrome.  History is from the patient, his parents, and EHR including Care Everywhere and scanned media.    HPI:      The patient lives in [place], [place], and has received care in numerous health care systems.  His sister was recently diagnosed with digenic Alport syndrome, arising by maternal COL4A5 p.[id number] mutation and paternal COL4A4 mutation for which we lack specifics.  His parents have known of intermittent hematuria and proteinuria since his childhood.  Remote urinalyses were normal in July 2015 and notable for 2+ dipstick protein with rare casts in April 2016.  Other available labs include creatinine of 1.3 in December 2017 and 1.24 in April 2022, and recent spot urine indicating unquantified microalbuminuria.  Renal ultrasound report dated April 2016 indicates 10.5-cm left and 7.9-cm right kidneys.    The patient has contended with illness since infancy, characterized by \"flares\" formerly brought on by food and/or environmental exposures, but now more independent thereof with recovery to a progressively less functional baseline.  With flares, he has exacerbation of chronic loose stools and variably abdominal cramps, skin outbreaks, and changes in cognition and mood.  He is chronically congested.  He denies significant urinary symptoms or hematuria.  He is nocturnal, contending with gastrointestinal systems overnight then sleeping through the morning.  He leaves the house infrequently.    PMHx:    1.  Chronic kidney disease, as above.  2.  Inborn error of immunity, suspected, longitudinal care with Dr. 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 without apparent encounters or outcomes.  3.  Enteropathy, since infancy, on lifelong elemental formula after early succession of unsuccessful dietary trials, upper endoscopy 2016 normal with normal pathology, colonoscopy identifying friable tissue with pathologic findings of hyperplastic lymphoid aggregates.  4.  Childhood hospitalizations, roughly annual through elementary school, for respiratory and/or gastrointestinal illnesses, at least one precipitated by influenza B and another by mycoplasma infection.  5.  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  6.  Chronic rhinosinusitis, as above.  7.  Hyperbilirubinemia.  8.  Maternal pregnancy complications of  labor, placental abruption and preeclampsia, delivered near term.    FHx:    Mother with hematuria, albuminuria, decreased GFR and COL4A5 mutation as above.  Father who follows with Dr. Gardner in [place], [place] for hematuria with COL4A4 finding as above.  Sister with Alport syndrome as above, presenting with pregnancy complications.  Another older sister with COL4 mutation(s) by cascade testing.  At least one sister with corneal ulcers.  Maternal great aunt who underwent kidney transplantation.  Several maternal relatives with hematuria.  Additional family history as documented in scanned pedigree of 2023.    SHx:    As above.  Schooling interruptions, nevertheless completing college.  No tobacco or alcohol.    Active Medications:  Current Outpatient  Medications   Medication Sig Dispense Refill    adapalene-benzoyl peroxide (EPIDUO FORTE) 0.3-2.5 % gel Apply topically daily      Cholestyramine POWD Take 6 g by mouth      Nutritional Supplements (NEOCATE INFANT) POWD TAKE AS DIRECTED UP  OUNCES (1.3 SCOOPS PER OUNCE, UP TO SIX 24 OUNCE BOTTLES PER DAY) EACH LEVEL SCOOP CONTAINS 1.75 GMS, ALLOW 26,676 GMS PER MONTH          Medications reviewed and reconciled.    Review of Systems:    10 point review of systems notable for items above.  Normal hearing test in elementary school.    Physical Exam:      The patient is a young man who is taller than average, thin, appears in no acute distress, masked, bespectacled, alert and conversant.    Labs and Imaging:    None.    Impression:    1.  Family history of Alport syndrome.  2.  Microalbuminuria.  3.  Hematuria, reported.  4.  Kidney size asymmetry, historic, not clearly corroborated.  5.  Inborn error of immunity, suspected.    Plan:      1.  Ascertain paternal mutation.   2.  Will review options for expediting COL4 mutation testing with [facility], likely reanalysis of previous whole exome sequencing.  3.  Planning for further nephrology care pending above.  4.  Today's notes copied to referring and other providers, with gratitude for the opportunity to participate in the patient s care, and offer of availability for further questions or concerns.  5.  Discharge from [facility] Disease Clinic.  Return as needed.     Patient Education:    The patient's course was reviewed with him and his parents in detail.  All pertinent labs reviewed.  We sketched out the anatomy of the kidneys and described their functions, indicating that labs to date were consistent with perhaps mild decrease in kidney cleaning capacity.  We agreed to revisit possible next steps once results of DNA testing were available.  We broached the possibility that considerations surrounding standard treatments for kidney disease would  have to account for the patient's background medical conditions.     Time: 75 minutes.    Sincerely,    Ezequiel De Leon MD, PhD  Nephrology    CC:   HERMNA ARIAS    Copy to patient  Marleny Elizabeth   [address] [facility] [address]

## 2023-11-03 NOTE — NURSING NOTE
"[facility] [[id number]]  Chief Complaint   Patient presents with    Consult     Alport syndrome     Initial /60 (BP Location: Right arm, Patient Position: Sitting, Cuff Size: Adult Regular)   Pulse 100   Ht 5' 9.78\" (177.2 cm)   Wt 137 lb 2 oz (62.2 kg)   BMI 19.80 kg/m   Estimated body mass index is 19.8 kg/m  as calculated from the following:    Height as of this encounter: 5' 9.78\" (177.2 cm).    Weight as of this encounter: 137 lb 2 oz (62.2 kg).  Medication Reconciliation: complete    Tracee Samaniego, EMT         "

## 2024-04-30 ENCOUNTER — TELEPHONE (OUTPATIENT)
Dept: CONSULT | Facility: CLINIC | Age: 29
End: 2024-04-30
Payer: COMMERCIAL

## 2024-04-30 DIAGNOSIS — K63.9 ENTEROPATHY: Primary | ICD-10-CM

## 2024-04-30 NOTE — Clinical Note
Hello,  What do you think about attempting genome vs waiting a couple years? Tricky part is they are in [place] and I cannot see them virtually due to licensure. He is part of [facility] at [facility], but of course the timeline and whether they receive a result from that is uncertain.  Thanks, Eloisa

## 2024-04-30 NOTE — TELEPHONE ENCOUNTER
April 30, 2024    I spoke with Patrick and his mother, Marleny, on the phone to discuss his recent genetic testing.    Reason for Testing  Enteropathy, immunodeficiency, family history of Alport Syndrome    Testing Ordered  XomeDx Reanalysis - Reanalysis of previously reviewed exome sequence data at [facility]    Result  Non-diagnostic. Patrick was found to have uncertain variants in the COL4A4 and TLR3 genes.  [facility] Secondary Findings: ACCEPTED with NONE IDENTIFIED.        Interpretation  COL4A4  Patrick was found to have an uncertain variant in the COL4A4 gene. We reviewed that a Variant of Uncertain Significance (VUS) means that the lab found a change (also called a mutation or variant) in Patrick's COL4A4 gene, but we are not sure if it causes health issues or if it is just part of the normal variation between individuals. A VUS may be reclassified into a positive or negative result in the future, as we learn more about different genes.    Harmful variants in COL4A4 are associated with either autosomal dominant or autosomal recessive Alport Syndrome. Alport syndrome is a genetic disorder characterized by kidney disease, hearing loss and eye abnormalities.  Most affected individuals have hematuria and proteinuria which is indicative of decreased kidney function.  Progressive kidney dysfunction can lead to end-stage renal disease. Alport syndrome demonstrates clinical variability, even among affected individuals in the same family.       COL4A4 can cause autosomal dominant or recessive Alport Syndrome. For the dominant form, a single harmful gene mutation is sufficient to cause the disease and there is a 50% risk to each child of an affected individual. Some individuals who have a single mutation in COL4A4 gene develop a less severe condition called thin basement membrane nephropathy (TBMN) which is characterized by non-progressive hematuria.  It remains unclear why some individuals with one mutation in the COL4A4 gene have  autosomal dominant Alport syndrome and others have familial TBMN. Autosomal recessive Alport Syndrome is due to having two harmful variants in an associated gene (such as COL4A4). It is unclear why some families exhibit dominant inheritance with COL4A4 and other exhibit recessive inheritance.    While Patrick's variant is uncertain, it is certainly possible that this variant is actually harmful and contributing to his history of hematuria and proteinuria. Patrick is encouraged to follow with a nephrologist for any management or screening recommendations.    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 The variant remains classified as uncertain and the lab does not report a gene-disease association. Their is some research implicating this gene in the function of the immune system, but more investigation is needed to understand if this could be contributing to symptoms like Patrick's.      This testing was not comprehensive for all possible genetic conditions; should there be a concern for Patrick for a genetic condition in the future, medical evaluation is still appropriate.     Next Steps  Patrick should share this result with his local nephrologist  Patrick and Marleny were interested in knowing whether this testing evaluated for Gilbert Disease. I have an inquiry out to [facility] about coverage and reporting of the UGT1A1 gene. I will reach out to the family once I hear back from the lab.  Whole Genome Sequencing could be considered; I will confer with Dr. Mack to see if he feels this would be appropriate to pursue at this time.    Patrick is encouraged to reach out with any additional questions or concerns.    Eloisa Ag MS, [facility]  Genetic Counseling  [facility]  Pager: [phone]  Phone: [phone]  Fax: [phone]

## 2024-04-30 NOTE — LETTER
4/30/2024      RE: Patrickburke Elizabeth  [address]  [facility] [address]     Dear Patrick,    Thank you for the opportunity to participate in your care at [facility]. Please let the following serve as a summary of our conversation regarding your recent genetic testing results. A copy of those results is also enclosed.    Reason for Testing  Enteropathy, immunodeficiency, family history of Alport Syndrome    Testing Ordered  XomeDx Reanalysis - Reanalysis of previously reviewed exome sequence data at [facility]    Result  Non-diagnostic. Patrick was found to have uncertain variants in the COL4A4 and TLR3 genes.  [facility] Secondary Findings: ACCEPTED with NONE IDENTIFIED.            Interpretation  COLERMELINDA4  Patrick was found to have an uncertain variant in the COL4A4 gene. We reviewed that a Variant of Uncertain Significance (VUS) means that the lab found a change (also called a mutation or variant) in Patrick's COL4A4 gene, but we are not sure if it causes health issues or if it is just part of the normal variation between individuals. A VUS may be reclassified into a positive or negative result in the future, as we learn more about different genes.    Harmful variants in COL4A4 are associated with either autosomal dominant or autosomal recessive Alport Syndrome. Alport syndrome is a genetic disorder characterized by kidney disease, hearing loss and eye abnormalities.  Most affected individuals have hematuria and proteinuria which is indicative of decreased kidney function.  Progressive kidney dysfunction can lead to end-stage renal disease. Alport syndrome demonstrates clinical variability, even among affected individuals in the same family.       COL4A4 can cause autosomal dominant or recessive Alport Syndrome. For the dominant form, a single harmful gene mutation is sufficient to cause the disease and there is a 50% risk to each child of an affected individual. Some individuals who have a single mutation in COL4A4  gene develop a less severe condition called thin basement membrane nephropathy (TBMN) which is characterized by non-progressive hematuria.  It remains unclear why some individuals with one mutation in the COL4A4 gene have autosomal dominant Alport syndrome and others have familial TBMN. Autosomal recessive Alport Syndrome is due to having two harmful variants in an associated gene (such as COL4A4). It is unclear why some families exhibit dominant inheritance with COL4A4 and other exhibit recessive inheritance.    While Patrick's variant is uncertain, it is certainly possible that this variant is actually harmful and contributing to his history of hematuria and proteinuria. Patrick is encouraged to follow with a nephrologist for any management or screening recommendations.    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 The variant remains classified as uncertain and the lab does not report a gene-disease association. Their is some research implicating this gene in the function of the immune system, but more investigation is needed to understand if this could be contributing to symptoms like Patrick's.    This testing was not comprehensive for all possible genetic conditions; should there be a concern for Patrick for a genetic condition in the future, medical evaluation is still appropriate.     Next Steps  Patrick should share this result with his local nephrologist  Patrick and Marleny were interested in knowing whether this testing evaluated for Gilbert Disease. I have an inquiry out to [facility] about coverage and reporting of the UGT1A1 gene. I will reach out to the family once I hear back from the lab.  Whole Genome Sequencing could be considered; I will confer with Dr. Mack to see if he feels this would be appropriate to pursue at this time. Otherwise, follow up in ~2 years.    Patrick is encouraged to reach out with any additional questions or  concerns.    Eloisa Ag MS, [facility]  Genetic Counseling  [facility]  Phone: [phone]

## 2024-05-07 ENCOUNTER — MYC MEDICAL ADVICE (OUTPATIENT)
Dept: CONSULT | Facility: CLINIC | Age: 29
End: 2024-05-07
Payer: COMMERCIAL

## 2024-05-17 ENCOUNTER — TELEPHONE (OUTPATIENT)
Dept: CONSULT | Facility: CLINIC | Age: 29
End: 2024-05-17
Payer: COMMERCIAL

## 2024-05-17 DIAGNOSIS — Z71.3 RESTRICTED DIET: ICD-10-CM

## 2024-05-17 DIAGNOSIS — K63.9 ENTEROPATHY: Primary | ICD-10-CM

## 2024-05-17 NOTE — TELEPHONE ENCOUNTER
"5/17/2024    I spoke with Patrick, his mother (Marleny) and father (Hernesto) on the phone to discuss the recommendations for next steps for Patrick given his negative [facility] Whole Exome Sequencing Re-Analysis result.    Gilbert Syndrome Testing Update  I shared that [facility] confirmed that they have ~80% coverage for the coding regions of UGT1A1 (the causative gene for Gilbert Syndrome) for Patrick and that this is consistent with their average coverage on XomeDx. However, there are common regulatory variants (functional polymorphisms) that would not be captured or reported by their KATYA methodology. The UGT1A1*28 polymorphism (a frequent cause of Gilbert Syndrome) is unfortunately not reportable by exome. Additionally, this is a promoter variant (upstream of the coding region), which is outside of their region of interest for exome sequencing.     Whole Genome Sequencing   We discussed the option of Whole Genome Sequencing (WGS) through the lab [facility]. I alerted them that [facility] communicated that Patrick's commercial insurance with secondary medicaid would be $0 out of pocket regardless of approval or denial. In light of this, and Dr. Mack's endorsement, Patrick elected to proceed with WGS through [facility].    Family History Update  Marleny provided the update to family history that she has been diagnosed with \"specific antibody deficiency\" after she has repeated pneumococcal titers before and after vaccination with the 23 strain pneumococcal vaccine, to which she had an inadequate response. She notes that when she was in second grade she had meningitis and was in a come for 10 days and that she had mumps twice in childhood. She also reports a history of immune concerns for her mother (Patrick's grandmother).    Consent for [facility] WGS  Patrick's genetic testing to date has not identified a unifying diagnosis for their health concerns, despite their history remaining highly suspicious for an underlying genetic cause. " We consider further genetic testing called genome sequencing (GS) the next appropriate step in his care.  This is similar to exome sequencing, but it sequences far more DNA than exome sequencing.  It picks up more genetic variants then exome sequencing; GS is able to identify more intronic variants, more structural variants (deletions, duplications, insertions, inversion, and regions of homozygosity), mitochondrial DNA variants, and variants within repeat regions.  GS is not a perfect test and it does not analyze every letter of the genome due to limited information about these regions and limitations in the technology. It also is not able to identify methylation differences and low level mosaicism. It can therefore miss genetic changes that could provide a diagnosis. As with previous genetic testing, this testing is diagnostic and not investigational.    We reviewed that there are three types of results that can be obtained from GS:  Positive Result: One possibility is that a variant (or variants) could be seen in Patrick and this variant (or variants) is known to cause similar symptoms to the symptoms Patrick has experienced.  A positive result may only explain some of Patrick's health concerns. This may provide more information on appropriate clinical management for Patrick and may provide information on additional potential health risks associated with Patrick's diagnosis.  A positive result can also have implications for the health and reproductive risks of other relatives.  Negative Result: It is also possible that no variants that are likely to explain Patrick's symptoms are found from GS.  A negative result would not completely rule out a possible genetic cause for Patrick's symptoms, but it does reduce the likelihood. It is possible that a genetic cause for Patrick's symptoms may be present and not detected by this test. As a result, follow-up in genetics may still be recommended and as genetic testing improves  over time, further testing may be offered.  Variant of Uncertain Significance (VUS): It is also possible that the laboratory detects a variant (or variants) in a gene, but they are not sure what it means.  Not all variants in our genes cause disease.  If the meaning of a genetic variant is unknown, the lab classifies the result as a VUS.  These findings are typically treated like a negative result, meaning that medical management will not be altered based on this finding.  VUSs should be monitored over time by the patient and clinician to see if they are later reclassified to a disease-causing variant (positive result) or benign variation (negative result).    An unexpected result is possible with any genetic test.  Examples include finding that the patient or a parent is a carrier for a genetic condition, identifying a genetic diagnosis in the patient or a parent which is loosely related to the patient's clinical presentation, or identifying that a reported relative who participated in testing is not the biological relative of the patient (e.g. nonpaternity).    GS Familial Samples  We discussed that samples from Patrick's family will be included in the analysis to help determine if genetic changes that are found are disease-causing mutations or benign variation.  Only changes that are found in Patrick that may contribute to his symptoms will be tested for in his family members and only genetic changes that the laboratory believes may contribute to Patrick's symptoms will be reported. Genetic testing in family members can lead to diagnoses, carrier status, or reveal family relationships (e.g. nonpaternity). Changes in genes that are not thought to contribute to Patrick's symptoms will not be included in the results report and will not be tested for in his family members. We will include the following family members:    Mother - Marleny Elizabeth - 2/10/1964  Father - Tobias Elizabeth - 4/21/1961    [facility] Secondary  Findings  We reviewed that the lab can report the results of genetic variants that are found in genes recommended by the American College of Medical Genetics and Genomics (ACMG) to be reported to GS patients even if the genetic variant does not contribute to their current symptoms.  These genetic changes are called secondary findings.  Many of these genetic mutations may not be associated with symptoms until adulthood and are not traditionally tested for in children but may lead to medical management changes. Examples include genes related to increased cancer risk and heart arrhythmias, among others.  When family member's samples are included, their status for secondary findings detected in the patient can be revealed.  It is important to note that family members are only evaluated for secondary findings that are identified in the patient; an independent evaluation of secondary findings is not conducted for family member samples.  Therefore, if a family or personal history is suggestive of a hereditary cancer predisposition syndrome in a tested family member (or other condition in a gene classified as secondary finding), this genetic test is not considered comprehensive and they should be evaluated independently with targeted genetic testing.    Patrick agreed to receiving secondary findings for himself and his parents agreed to receiving secondary findings themselves.     Research Studies  The laboratory can contact your healthcare provider or you directly regarding research studies.     Patrick agreed to be contacted for future research studies.    Genetic Information Nondiscrimination Act  There is a federal law in place, The Genetic Information Nondiscrimination Act or LIANNA (2008), that protects against health insurance and employment discrimination.  Health insurance protections do not apply to members of the [place]  who receive care through , veterans receiving care through the VA, the [facility]  "Service, or federal employees who receive care through Federal Employees Health Benefits Plan. Employers may not discriminate (hiring, firing, promotions etc.), based on genetic information. This only applies to companies with 15 or more employees. It does not apply to federal employees, or , which have their own nondiscrimination protections in place. Employers may have \"voluntary\" health services such as employee wellness programs that request genetic information or family history, which is not a violation of LIANNA. We discussed that there are insurance implications related to these findings in terms of life, short-term disability, and long-term disability insurance.    Pharmacogenomic Results  Some people harbor genetic variants in genes that metabolize medications. These are called pharmacogenomic variants. As part of the genetic testing, the lab can analyze for pharmacogenomic variants that may impact medication choices/dosing. Work from the National [employer] of [employer] Undiagnosed Diseases Program found that each subject within their cohort had at least 1 pharmacogenomic-related finding and ~1% had a finding related to a medication that they were currently taking. Importantly, many individuals find there is limited clinical utility because pharmacogenomic testing will not guarantee that someone will find a medication to resolve a health issue or perfect dosing. It may help reduce the \"trial-and-error\" period to find the best medication, especially early in the process. It is most helpful for anti-coagulants and some chemotherapy medications. The genetics department does not interpret these results. If the patient elects to receive these results, they will be referred to a specializing pharmacist for results interpretation.  At this time, Patrick accepted pharmacogenomic results.    We did discuss the the common polymorphism causing Gilbert Syndrome is part of the pharmacogenomic assessment.    Benefits " Investigation and Initiating Testing  Buccal kits will be sent to the family from [employer]. [employer] will look into the costs of testing through the family's insurance on their behalf and attempt prior authorization. This takes 2-4 weeks. [employer] will contact the family with this information. Per [employer] Patrick's commercial insurance with secondary medicaid would be $0 out of pocket regardless of approval or denial. A docusign form will be mailed to Patrick and his parents to obtain consent.    Plan:  1. After reviewing the risks, benefits, and limitations of genetic testing, consent was obtained for genome sequencing for Patrick via a buccal sample. Patrick's parents consented to providing buccal samples to assist in the interpretation of Patrick's results.  2. Results are expected in 4-6 months, pending benefits investigation. These will be disclosed over the phone, and a follow up appointment will be made to discuss results in further detail.  Results will not be left over voicemail, regardless of outcome (positive or negative).  3. I will update the family on progress getting this testing order submitted on 5/24 via [employer] messaging.    Eloisa Ag MS, [facility]  Genetic Counseling  [facility]  Email: [email or web address]  Phone: [phone]  Fax: [phone]

## 2024-09-27 DIAGNOSIS — K63.9 ENTEROPATHY: Primary | ICD-10-CM

## 2024-09-30 ENCOUNTER — TELEPHONE (OUTPATIENT)
Dept: CONSULT | Facility: CLINIC | Age: 29
End: 2024-09-30
Payer: COMMERCIAL

## 2024-09-30 DIAGNOSIS — Z13.79 ENCOUNTER FOR PHARMACOGENETIC TESTING: Primary | ICD-10-CM

## 2024-09-30 NOTE — TELEPHONE ENCOUNTER
9/27/2024    Thank you for allowing us to be a part of Patrick's healthcare at [facility].  At your most recent visit, genetic testing, specifically, Whole Genome Sequencing through the lab [facility] was pursued.  As we have discussed by telephone this test returned NEGATIVE (normal).     Whole Genome Sequencing  We reviewed how genome sequencing looks at the coding parts of the genes to look for gene changes that may explain a person's symptoms, but it also picks up more genetic variants such as intronic variants, structural variants (deletions, duplications, insertions, inversion, and regions of homozygosity), mitochondrial DNA variants, and variants within repeat regions.  GS is not a perfect test and it does not analyze every letter of the genome due to limited information about these regions and limitations in the technology. It also is not able to identify methylation differences and low level mosaicism. It can therefore miss genetic changes that could provide a diagnosis.     Patrick's family ACCEPTED Secondary Findings for Patrick and both parents - None were identified.    Results    This test has ruled out many genetic causes for Patrick's symptoms, but because no testing is perfect, and there is still much that is unknown in genetics, this normal result does not rule out a genetic cause for Patrick's symptoms. As a result, we recommend following up with Dr. Mack in person in genetics clinic for updated phenotyping and discussion of reanalysis in ~March 2026.    There were no secondary findings for Patrick, and so Patrick's family members were not evaluated for secondary findings. Therefore, if a family or personal history is suggestive of a hereditary cancer predisposition syndrome in a tested family member (or other condition in a gene classified as secondary finding), this genetic test is not considered comprehensive and they should be evaluated independently with targeted genetic  testing.    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 Please see prior genetics notes for interpretation of these variants.    Pharmacogenomic Results        Gilbert Syndrome  We discussed that Patrick was found to have two copies of the common risk UGT1A1 gene promoter variant (also called a polymorphism): called UGT1A1*28 or c.-[id number]. The numbers and letters in this result stand for exactly where in the gene the genetic change is located and what change was found. Specifically, two copies of this polymorphism have been associated with increased risk for Gilbert syndrome and fluctuating bilirubin levels. Gilbert syndrome is a relatively mild condition characterized by periods of elevated levels of bilirubin in the blood (hyperbilirubinemia). In affected individuals, bilirubin levels fluctuate and very rarely increase to levels that cause jaundice.     Follow-Up  We would like to see Patrick back in clinic in 18 months (~March 2026), or sooner if new concerns arise. To schedule, please call [facility] genetics , at [phone].     We discussed the option of a referral to our Medication Therapy Management team for interpretation/discussion of Patrick's pharmacogenomic results. Patrick was interested; we will place a referral.    Please do not hesitate to reach out with any ongoing needs.    Best,    Eloisa Ag MS, [facility]  Genetic Counseling  [facility]  Phone: [phone]  Fax: [phone]

## 2024-09-30 NOTE — LETTER
9/30/2024    RE: Patrick Elizabeth  [address]  [place] [id number]     Dear Patrick,    Thank you for the opportunity to participate in your care at [facility]. Please let the following serve as a summary of our conversation regarding your recent genetic testing results. A copy of those results is also enclosed.    At your most recent visit, genetic testing, specifically, Whole Genome Sequencing through the lab [facility] was pursued.  As we have discussed by telephone this test returned NEGATIVE (normal).     Whole Genome Sequencing  We reviewed how genome sequencing looks at the coding parts of the genes to look for gene changes that may explain a person's symptoms, but it also picks up more genetic variants such as intronic variants, structural variants (deletions, duplications, insertions, inversion, and regions of homozygosity), mitochondrial DNA variants, and variants within repeat regions.  GS is not a perfect test and it does not analyze every letter of the genome due to limited information about these regions and limitations in the technology. It also is not able to identify methylation differences and low level mosaicism. It can therefore miss genetic changes that could provide a diagnosis.     Patrick and family ACCEPTED Secondary Findings for Patrick and both parents - None were identified.    Results  This test has ruled out many genetic causes for Patrick's symptoms, but because no testing is perfect, and there is still much that is unknown in genetics, this normal result does not rule out a genetic cause for Patrick's symptoms. As a result, we recommend following up with Dr. Mack in person in genetics clinic for updated phenotyping and discussion of reanalysis in ~March 2026.    There were no secondary findings for Patrick, and so Patrick's family members were not evaluated for secondary findings. Therefore, if a family or personal history is suggestive of a hereditary cancer  predisposition syndrome in a tested family member (or other condition in a gene classified as secondary finding), this genetic test is not considered comprehensive and they should be evaluated independently with targeted genetic testing.    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 Please see prior genetics notes for interpretation of these variants.    Pharmacogenomic Results        Gilbert Syndrome  We discussed that Patrick was found to have two copies of the common risk UGT1A1 gene promoter variant (also called a polymorphism): called UGT1A1*28 or c.-[id number]. The numbers and letters in this result stand for exactly where in the gene the genetic change is located and what change was found. Specifically, two copies of this polymorphism have been associated with increased risk for Gilbert syndrome and fluctuating bilirubin levels. Gilbert syndrome is a relatively mild condition characterized by periods of elevated levels of bilirubin in the blood (hyperbilirubinemia). In affected individuals, bilirubin levels fluctuate and very rarely increase to levels that cause jaundice.     Follow-Up  We would like to see Patrick back in clinic in 18 months (~March 2026), or sooner if new concerns arise. To schedule, please call [facility] genetics , at [phone]. We discussed the option of a referral to our Medication Therapy Management team for interpretation/discussion of Patrick's pharmacogenomic results. Patrick was interested; we will place a referral. Please do not hesitate to reach out with any ongoing needs.    Felipe,    Eloisa Ag MS, [facility]  Genetic Counseling  [facility]  Phone: [phone]

## 2024-10-01 ENCOUNTER — TELEPHONE (OUTPATIENT)
Dept: PHARMACY | Facility: OTHER | Age: 29
End: 2024-10-01
Payer: COMMERCIAL

## 2024-10-01 NOTE — TELEPHONE ENCOUNTER
MTM referral from: [place] clinic visit (referral by provider)  Referral for specialty to review PGX results drawn outside of [facility]    MTM referral outreach attempt #2 on October 1, 2024 at 2:40 PM      Outcome: Spoke with patient and mother, patient lives in [place], unable to have MTM appointment in [place] and if drove to [place] would be private pay. I sent results via [employer] to patient. Patient will seek MTM in [place]    Use Private Pay for the carrier/Plan on the flowsheet          Angle Wilkinson [facility]  -[facility]  [phone]

## 2024-11-14 ENCOUNTER — MYC MEDICAL ADVICE (OUTPATIENT)
Dept: CONSULT | Facility: CLINIC | Age: 29
End: 2024-11-14

## 2024-12-15 ENCOUNTER — HEALTH MAINTENANCE LETTER (OUTPATIENT)
Age: 29
End: 2024-12-15